# Patient Record
Sex: MALE | Race: WHITE | NOT HISPANIC OR LATINO | Employment: FULL TIME | URBAN - NONMETROPOLITAN AREA
[De-identification: names, ages, dates, MRNs, and addresses within clinical notes are randomized per-mention and may not be internally consistent; named-entity substitution may affect disease eponyms.]

---

## 2020-01-27 ENCOUNTER — LAB (OUTPATIENT)
Dept: LAB | Facility: HOSPITAL | Age: 45
End: 2020-01-27

## 2020-01-27 ENCOUNTER — OFFICE VISIT (OUTPATIENT)
Dept: GASTROENTEROLOGY | Facility: CLINIC | Age: 45
End: 2020-01-27

## 2020-01-27 VITALS
BODY MASS INDEX: 27.74 KG/M2 | HEIGHT: 72 IN | OXYGEN SATURATION: 96 % | SYSTOLIC BLOOD PRESSURE: 124 MMHG | HEART RATE: 70 BPM | TEMPERATURE: 98.2 F | DIASTOLIC BLOOD PRESSURE: 80 MMHG | RESPIRATION RATE: 18 BRPM | WEIGHT: 204.8 LBS

## 2020-01-27 DIAGNOSIS — E66.3 OVERWEIGHT (BMI 25.0-29.9): ICD-10-CM

## 2020-01-27 DIAGNOSIS — R19.5 LOOSE STOOLS: ICD-10-CM

## 2020-01-27 DIAGNOSIS — K64.8 INTERNAL HEMORRHOIDS: ICD-10-CM

## 2020-01-27 DIAGNOSIS — K92.1 HEMATOCHEZIA: Primary | ICD-10-CM

## 2020-01-27 DIAGNOSIS — K92.1 HEMATOCHEZIA: ICD-10-CM

## 2020-01-27 LAB
ALBUMIN SERPL-MCNC: 4.4 G/DL (ref 3.5–5.2)
ALBUMIN/GLOB SERPL: 1.4 G/DL
ALP SERPL-CCNC: 63 U/L (ref 39–117)
ALT SERPL W P-5'-P-CCNC: 14 U/L (ref 1–41)
ANION GAP SERPL CALCULATED.3IONS-SCNC: 12.3 MMOL/L (ref 5–15)
AST SERPL-CCNC: 19 U/L (ref 1–40)
BASOPHILS # BLD AUTO: 0.04 10*3/MM3 (ref 0–0.2)
BASOPHILS NFR BLD AUTO: 0.5 % (ref 0–1.5)
BILIRUB SERPL-MCNC: 0.5 MG/DL (ref 0.2–1.2)
BUN BLD-MCNC: 16 MG/DL (ref 6–20)
BUN/CREAT SERPL: 15.4 (ref 7–25)
CALCIUM SPEC-SCNC: 9.6 MG/DL (ref 8.6–10.5)
CHLORIDE SERPL-SCNC: 101 MMOL/L (ref 98–107)
CO2 SERPL-SCNC: 26.7 MMOL/L (ref 22–29)
CREAT BLD-MCNC: 1.04 MG/DL (ref 0.76–1.27)
DEPRECATED RDW RBC AUTO: 39.4 FL (ref 37–54)
EOSINOPHIL # BLD AUTO: 0.14 10*3/MM3 (ref 0–0.4)
EOSINOPHIL NFR BLD AUTO: 1.7 % (ref 0.3–6.2)
ERYTHROCYTE [DISTWIDTH] IN BLOOD BY AUTOMATED COUNT: 12.8 % (ref 12.3–15.4)
GFR SERPL CREATININE-BSD FRML MDRD: 78 ML/MIN/1.73
GLOBULIN UR ELPH-MCNC: 3.1 GM/DL
GLUCOSE BLD-MCNC: 90 MG/DL (ref 65–99)
HCT VFR BLD AUTO: 46.7 % (ref 37.5–51)
HGB BLD-MCNC: 16.5 G/DL (ref 13–17.7)
IMM GRANULOCYTES # BLD AUTO: 0.03 10*3/MM3 (ref 0–0.05)
IMM GRANULOCYTES NFR BLD AUTO: 0.4 % (ref 0–0.5)
LYMPHOCYTES # BLD AUTO: 1.25 10*3/MM3 (ref 0.7–3.1)
LYMPHOCYTES NFR BLD AUTO: 14.9 % (ref 19.6–45.3)
MCH RBC QN AUTO: 30.4 PG (ref 26.6–33)
MCHC RBC AUTO-ENTMCNC: 35.3 G/DL (ref 31.5–35.7)
MCV RBC AUTO: 86.2 FL (ref 79–97)
MONOCYTES # BLD AUTO: 0.73 10*3/MM3 (ref 0.1–0.9)
MONOCYTES NFR BLD AUTO: 8.7 % (ref 5–12)
NEUTROPHILS # BLD AUTO: 6.19 10*3/MM3 (ref 1.7–7)
NEUTROPHILS NFR BLD AUTO: 73.8 % (ref 42.7–76)
NRBC BLD AUTO-RTO: 0 /100 WBC (ref 0–0.2)
PLATELET # BLD AUTO: 220 10*3/MM3 (ref 140–450)
PMV BLD AUTO: 10.7 FL (ref 6–12)
POTASSIUM BLD-SCNC: 4.3 MMOL/L (ref 3.5–5.2)
PROT SERPL-MCNC: 7.5 G/DL (ref 6–8.5)
RBC # BLD AUTO: 5.42 10*6/MM3 (ref 4.14–5.8)
SODIUM BLD-SCNC: 140 MMOL/L (ref 136–145)
WBC NRBC COR # BLD: 8.38 10*3/MM3 (ref 3.4–10.8)

## 2020-01-27 PROCEDURE — 85025 COMPLETE CBC W/AUTO DIFF WBC: CPT

## 2020-01-27 PROCEDURE — 36415 COLL VENOUS BLD VENIPUNCTURE: CPT

## 2020-01-27 PROCEDURE — 99204 OFFICE O/P NEW MOD 45 MIN: CPT | Performed by: INTERNAL MEDICINE

## 2020-01-27 PROCEDURE — 80053 COMPREHEN METABOLIC PANEL: CPT

## 2020-01-27 RX ORDER — SODIUM, POTASSIUM,MAG SULFATES 17.5-3.13G
2 SOLUTION, RECONSTITUTED, ORAL ORAL ONCE
Qty: 2 BOTTLE | Refills: 0 | Status: SHIPPED | OUTPATIENT
Start: 2020-01-27 | End: 2020-01-27

## 2020-01-27 RX ORDER — SODIUM CHLORIDE 9 MG/ML
70 INJECTION, SOLUTION INTRAVENOUS CONTINUOUS PRN
Status: CANCELLED | OUTPATIENT
Start: 2020-01-27

## 2020-01-27 RX ORDER — METOCLOPRAMIDE 10 MG/1
10 TABLET ORAL ONCE
Qty: 1 TABLET | Refills: 0 | Status: SHIPPED | OUTPATIENT
Start: 2020-01-27 | End: 2020-01-27

## 2020-01-27 NOTE — PROGRESS NOTES
New Patient Consult      Date: 2020   Patient Name: Yonathan Ley  MRN: [id number]  : 1975     Referring Physician: Vika Higgins, *    Chief Complaint   Patient presents with   • Rectal Bleeding       History of Present Illness: Yonathan Ley is a 44 y.o. male who is here today to establish care with Gastroenterology for evaluation of rectal bleeding. History of rectal bleeding with red blood in wipes  and toilet bowel. Had these episodes few times in the past couple of years, but recently he noticed large amount which is not normal to him. He has intermittent itching, No anorectal pain. This patient deny any abdominal pain, change in bowel habit, melena.  Usually his BM is mostly loose 3-5 times a day. Weight is stable. Pt denies nausea vomiting or odynophagia or dysphagia. There is no history of acid reflux. There is no history of anemia. No prior history of EGD. Last colonoscopy around  and was told he has hemorriods. No family history of colon cancer or any GI malignancy.     Subjective      Past Medical History:   Past Medical History:   Diagnosis Date   • Blood in stool        Past Surgical History:   Past Surgical History:   Procedure Laterality Date   • HERNIA REPAIR         Family History:   Family History   Problem Relation Age of Onset   • Ovarian cancer Mother    • Diabetes Mother    • Lung cancer Father    • Diabetes Father        Social History:   Social History     Socioeconomic History   • Marital status:      Spouse name: Not on file   • Number of children: Not on file   • Years of education: Not on file   • Highest education level: Not on file   Tobacco Use   • Smoking status: Never Smoker   • Smokeless tobacco: Current User   Substance and Sexual Activity   • Alcohol use: Never     Frequency: Never   • Drug use: Never   • Sexual activity: Defer         Current Outpatient Medications:   •  metoclopramide (REGLAN) 10 MG tablet, Take 1 tablet by mouth 1  "(One) Time for 1 dose. Please see prep instructions from office., Disp: 1 tablet, Rfl: 0  •  sodium-potassium-magnesium sulfates (SUPREP BOWEL PREP KIT) 17.5-3.13-1.6 GM/177ML solution oral solution, Take 2 bottles by mouth 1 (One) Time for 1 dose. Please see prep instructions from office., Disp: 2 bottle, Rfl: 0    No Known Allergies    Review of Systems:   Review of Systems   Constitutional: Negative for chills, fever, unexpected weight gain and unexpected weight loss.   HENT: Negative for congestion, ear pain, postnasal drip, sinus pressure and sore throat.    Eyes: Negative for blurred vision and visual disturbance.   Respiratory: Negative for cough, chest tightness and shortness of breath.    Cardiovascular: Negative for chest pain, palpitations and leg swelling.   Gastrointestinal: Positive for anal bleeding, GERD and indigestion. Negative for abdominal pain, blood in stool, constipation, diarrhea, nausea and vomiting.   Endocrine: Negative for polyphagia.   Genitourinary: Negative for dysuria and hematuria.   Musculoskeletal: Negative for back pain, joint swelling and neck pain.   Skin: Negative for rash, skin lesions and bruise.   Neurological: Negative for dizziness, seizures, speech difficulty, weakness, numbness and confusion.   Hematological: Negative for adenopathy. Does not bruise/bleed easily.   Psychiatric/Behavioral: Negative for hallucinations, suicidal ideas and depressed mood.       The following portions of the patient's history were reviewed and updated as appropriate: allergies, current medications, past family history, past medical history, past social history, past surgical history and problem list.    Objective     Physical Exam:  Vital Signs:   Vitals:    01/27/20 1450   BP: 124/80   Pulse: 70   Resp: 18   Temp: 98.2 °F (36.8 °C)   TempSrc: Temporal   SpO2: 96%   Weight: 92.9 kg (204 lb 12.8 oz)   Height: 182.9 cm (72\")       Physical Exam   Constitutional: He is oriented to person, place, " and time. He appears well-developed and well-nourished.   HENT:   Head: Normocephalic and atraumatic.   Right Ear: External ear normal.   Left Ear: External ear normal.   Mouth/Throat: Oropharynx is clear and moist.   Eyes: Pupils are equal, round, and reactive to light. Conjunctivae and EOM are normal.   Neck: Normal range of motion. No tracheal deviation present. No thyromegaly present.   Cardiovascular: Normal rate and regular rhythm.   No murmur heard.  Pulmonary/Chest: Effort normal and breath sounds normal. No respiratory distress.   Abdominal: Soft. Bowel sounds are normal. He exhibits no mass. No hernia.   Musculoskeletal: Normal range of motion. He exhibits no edema.   Neurological: He is alert and oriented to person, place, and time. No cranial nerve deficit or sensory deficit.   Skin: Skin is warm and dry.   Psychiatric: He has a normal mood and affect. His behavior is normal. Judgment and thought content normal.   Nursing note and vitals reviewed.      Results Review:   I have reviewed the patient's new clinical and imaging results and agree with the interpretation.     Assessment / Plan      Assessment & Plan:  1. Hematochezia  Patient states that he always had some red blood in wipes intermittently but this time he had a alarming amount of blood couple of weeks ago.  Deny any melena.  No abdominal pain.  Does have a loose stool 3-4 times daily which is chronic not bothering him as per patient.  Nausea vomiting.  Colonoscopy about 10 years ago told normal except hemorrhoids.  This could be hemorrhoidal bleeding however to rule out any other lower GI bleeding pathology including neoplastic process to rule out.  We will schedule him for colonoscopy now  - Case Request; Standing  - sodium chloride 0.9 % infusion  - Case Request  - metoclopramide (REGLAN) 10 MG tablet; Take 1 tablet by mouth 1 (One) Time for 1 dose. Please see prep instructions from office.  Dispense: 1 tablet; Refill: 0  -  sodium-potassium-magnesium sulfates (SUPREP BOWEL PREP KIT) 17.5-3.13-1.6 GM/177ML solution oral solution; Take 2 bottles by mouth 1 (One) Time for 1 dose. Please see prep instructions from office.  Dispense: 2 bottle; Refill: 0  - CBC Auto Differential; Future  - Comprehensive Metabolic Panel; Future    2. Internal hemorrhoids  Patient appears to have intermittent flareup of his internal hemorrhoids.  Now has occasional itching no pain.  Advised sitz bath twice daily  Preparation H topical as needed    3. Loose stools  Usually 3-4 bowel movements loose daily, gets worse with the daily food.  No family history of celiac disease.  No any lab studies done for the past 4 years  Will get the basic labs and also get a random colon biopsy to rule out microscopic colitis  - CBC Auto Differential; Future  - Comprehensive Metabolic Panel; Future    4. Overweight (BMI 25.0-29.9)  Dietary changes and lifestyle changes have been discussed      Follow Up:   Return in about 8 weeks (around 3/23/2020).    Yoni Judge MD  [facility] [place]  1/27/2020  3:24 PM    Please note that portions of this note may have been completed with a voice recognition program. Efforts were made to edit the dictations, but occasionally words are mistranscribed.

## 2020-01-29 PROBLEM — K92.1 HEMATOCHEZIA: Status: ACTIVE | Noted: 2020-01-29

## 2020-01-29 PROBLEM — K64.8 INTERNAL HEMORRHOIDS: Status: ACTIVE | Noted: 2020-01-29

## 2020-02-21 ENCOUNTER — ANESTHESIA EVENT (OUTPATIENT)
Dept: GASTROENTEROLOGY | Facility: HOSPITAL | Age: 45
End: 2020-02-21

## 2020-02-21 ENCOUNTER — HOSPITAL ENCOUNTER (OUTPATIENT)
Facility: HOSPITAL | Age: 45
Setting detail: HOSPITAL OUTPATIENT SURGERY
Discharge: HOME | End: 2020-02-21
Attending: INTERNAL MEDICINE | Admitting: INTERNAL MEDICINE

## 2020-02-21 ENCOUNTER — ANESTHESIA (OUTPATIENT)
Dept: GASTROENTEROLOGY | Facility: HOSPITAL | Age: 45
End: 2020-02-21

## 2020-02-21 VITALS
TEMPERATURE: 97.8 F | HEIGHT: 72 IN | BODY MASS INDEX: 27.02 KG/M2 | RESPIRATION RATE: 18 BRPM | OXYGEN SATURATION: 99 % | DIASTOLIC BLOOD PRESSURE: 86 MMHG | SYSTOLIC BLOOD PRESSURE: 120 MMHG | HEART RATE: 66 BPM | WEIGHT: 199.5 LBS

## 2020-02-21 DIAGNOSIS — K64.8 INTERNAL HEMORRHOIDS: ICD-10-CM

## 2020-02-21 DIAGNOSIS — K92.1 HEMATOCHEZIA: ICD-10-CM

## 2020-02-21 PROCEDURE — 25010000002 PROPOFOL 200 MG/20ML EMULSION: Performed by: NURSE ANESTHETIST, CERTIFIED REGISTERED

## 2020-02-21 RX ORDER — LIDOCAINE HYDROCHLORIDE 20 MG/ML
INJECTION, SOLUTION INTRAVENOUS AS NEEDED
Status: DISCONTINUED | OUTPATIENT
Start: 2020-02-21 | End: 2020-02-21 | Stop reason: SURG

## 2020-02-21 RX ORDER — SODIUM CHLORIDE 9 MG/ML
70 INJECTION, SOLUTION INTRAVENOUS CONTINUOUS PRN
Status: DISCONTINUED | OUTPATIENT
Start: 2020-02-21 | End: 2020-02-21 | Stop reason: HOSPADM

## 2020-02-21 RX ORDER — PROPOFOL 10 MG/ML
INJECTION, EMULSION INTRAVENOUS AS NEEDED
Status: DISCONTINUED | OUTPATIENT
Start: 2020-02-21 | End: 2020-02-21 | Stop reason: SURG

## 2020-02-21 RX ADMIN — PROPOFOL 100 MG: 10 INJECTION, EMULSION INTRAVENOUS at 08:46

## 2020-02-21 RX ADMIN — SODIUM CHLORIDE 70 ML/HR: 9 INJECTION, SOLUTION INTRAVENOUS at 06:50

## 2020-02-21 RX ADMIN — LIDOCAINE HYDROCHLORIDE 60 MG: 20 INJECTION, SOLUTION INTRAVENOUS at 08:37

## 2020-02-21 RX ADMIN — PROPOFOL 100 MG: 10 INJECTION, EMULSION INTRAVENOUS at 08:42

## 2020-02-21 RX ADMIN — PROPOFOL 50 MG: 10 INJECTION, EMULSION INTRAVENOUS at 08:50

## 2020-02-21 RX ADMIN — PROPOFOL 50 MG: 10 INJECTION, EMULSION INTRAVENOUS at 08:37

## 2020-02-21 NOTE — ANESTHESIA POSTPROCEDURE EVALUATION
Patient: Yonathan Ley    Procedure Summary     Date:  02/21/20 Room / Location:  [facility] ENDOSCOPY 2 / [facility] ENDOSCOPY    Anesthesia Start:  0837 Anesthesia Stop:  0857    Procedure:  COLONOSCOPY BIOPSY AN DPOLYPECTOMY (N/A Anus) Diagnosis:       Hematochezia      Internal hemorrhoids      (Hematochezia [K92.1])      (Internal hemorrhoids [K64.8])    Surgeon:  Yoni Judge MD Provider:  Rodrigo Flores CRNA    Anesthesia Type:  MAC ASA Status:  1          Anesthesia Type: MAC    Vitals  No vitals data found for the desired time range.          Post Anesthesia Care and Evaluation    Patient location during evaluation: bedside  Patient participation: complete - patient participated  Level of consciousness: awake and alert  Pain score: 0  Pain management: adequate  Airway patency: patent  Anesthetic complications: No anesthetic complications  PONV Status: none  Cardiovascular status: acceptable  Respiratory status: acceptable  Hydration status: acceptable

## 2020-02-21 NOTE — DISCHARGE INSTRUCTIONS
To assist you in voiding:  Drink plenty of fluids  Listen to running water while attempting to void.    If you are unable to urinate and you have an uncomfortable urge to void or it has been   6 hours since you were discharged, return to the Emergency Room.      Rest today  No pushing,pulling,tugging,heavy lifting, or strenuous activity   No major decision making,driving,or drinking alcoholic beverages for 24 hours due to the sedation you received  Always use good hand hygiene/washing technique  No driving on pain medication.

## 2020-02-21 NOTE — ANESTHESIA PREPROCEDURE EVALUATION
Anesthesia Evaluation     Patient summary reviewed and Nursing notes reviewed   NPO Solid Status: > 8 hours  NPO Liquid Status: > 8 hours           Airway   Mallampati: II  TM distance: >3 FB  Neck ROM: full  No difficulty expected  Dental      Pulmonary    Cardiovascular         Neuro/Psych  GI/Hepatic/Renal/Endo      Musculoskeletal     Abdominal    Substance History      OB/GYN          Other                        Anesthesia Plan    ASA 1     MAC     intravenous induction     Anesthetic plan, all risks, benefits, and alternatives have been provided, discussed and informed consent has been obtained with: patient.    Plan discussed with CRNA.

## 2020-02-28 LAB
LAB AP CASE REPORT: NORMAL
PATH REPORT.FINAL DX SPEC: NORMAL

## 2020-03-23 ENCOUNTER — OFFICE VISIT (OUTPATIENT)
Dept: GASTROENTEROLOGY | Facility: CLINIC | Age: 45
End: 2020-03-23

## 2020-03-23 VITALS
DIASTOLIC BLOOD PRESSURE: 80 MMHG | BODY MASS INDEX: 27.5 KG/M2 | TEMPERATURE: 97.8 F | SYSTOLIC BLOOD PRESSURE: 122 MMHG | WEIGHT: 203 LBS | HEIGHT: 72 IN | HEART RATE: 69 BPM | RESPIRATION RATE: 18 BRPM | OXYGEN SATURATION: 98 %

## 2020-03-23 DIAGNOSIS — K92.1 HEMATOCHEZIA: ICD-10-CM

## 2020-03-23 DIAGNOSIS — K63.5 HYPERPLASTIC COLONIC POLYP, UNSPECIFIED PART OF COLON: ICD-10-CM

## 2020-03-23 DIAGNOSIS — K64.8 INTERNAL HEMORRHOIDS: Primary | ICD-10-CM

## 2020-03-23 PROCEDURE — 99213 OFFICE O/P EST LOW 20 MIN: CPT | Performed by: INTERNAL MEDICINE

## 2020-03-23 NOTE — PROGRESS NOTES
Follow Up Note     Date: 2020   Patient Name: Yonathan Ley  MRN: [id number]  : 1975     Referring Physician: No ref. provider found    Chief Complaint:    Chief Complaint   Patient presents with   • Follow-up   • Rectal Bleeding       Interval History:   3/23/2020  Yonathan Ley is a 44 y.o. male who is here today for follow up after colonoscopy.  He is occasionally still saying tinge of blood in the wipe.  He has not used Anusol cream so far.   Denies any new symptoms    2020  Yonathan eLy is a 44 y.o. male who is here today to establish care with Gastroenterology for evaluation of rectal bleeding. History of rectal bleeding with red blood in wipes  and toilet bowel. Had these episodes few times in the past couple of years, but recently he noticed large amount which is not normal to him. He has intermittent itching, No anorectal pain. This patient deny any abdominal pain, change in bowel habit, melena.  Usually his BM is mostly loose 3-5 times a day. Weight is stable. Pt denies nausea vomiting or odynophagia or dysphagia. There is no history of acid reflux. There is no history of anemia. No prior history of EGD. Last colonoscopy around  and was told he has hemorriods. No family history of colon cancer or any GI malignancy    Subjective      Past Medical History: @[facility]  Past Surgical History:   Past Surgical History:   Procedure Laterality Date   • COLONOSCOPY     • COLONOSCOPY N/A 2020    Procedure: COLONOSCOPY BIOPSY AN DPOLYPECTOMY;  Surgeon: Yoni Judge MD;  Location: [facility] ENDOSCOPY;  Service: Gastroenterology;  Laterality: N/A;   • HERNIA REPAIR         Family History:   Family History   Problem Relation Age of Onset   • Ovarian cancer Mother    • Diabetes Mother    • Lung cancer Father    • Diabetes Father        Social History:   Social History     Socioeconomic History   • Marital status:      Spouse name: Not on file   • Number of children:  "Not on file   • Years of education: Not on file   • Highest education level: Not on file   Tobacco Use   • Smoking status: Never Smoker   • Smokeless tobacco: Current User   Substance and Sexual Activity   • Alcohol use: Never     Frequency: Never   • Drug use: Never   • Sexual activity: Defer       Medications:   No current outpatient medications on file.    Allergies:   No Known Allergies    Review of Systems:   Review of Systems   Constitutional: Negative for appetite change, fatigue and unexpected weight loss.   Gastrointestinal: Positive for anal bleeding. Negative for abdominal distention, abdominal pain, blood in stool, constipation, diarrhea, nausea, rectal pain, vomiting, GERD and indigestion.       The following portions of the patient's history were reviewed and updated as appropriate: allergies, current medications, past family history, past medical history, past social history, past surgical history and problem list.    Objective     Physical Exam:  Vital Signs:   Vitals:    03/23/20 1456   BP: 122/80   Pulse: 69   Resp: 18   Temp: 97.8 °F (36.6 °C)   TempSrc: Temporal   SpO2: 98%   Weight: 92.1 kg (203 lb)   Height: 182.9 cm (72\")       Physical Exam   Constitutional: He is oriented to person, place, and time. Vital signs are normal. He appears well-developed and well-nourished.   HENT:   Head: Normocephalic and atraumatic.   Right Ear: External ear normal.   Left Ear: External ear normal.   Nose: Nose normal.   Mouth/Throat: Oropharynx is clear and moist.   Eyes: Conjunctivae are normal.   Neck: Normal range of motion. Neck supple.   Cardiovascular: Normal rate, regular rhythm and normal heart sounds.   Pulmonary/Chest: Effort normal and breath sounds normal.   Abdominal: Soft. Bowel sounds are normal. He exhibits no distension, no ascites and no mass. There is no tenderness.   Musculoskeletal: Normal range of motion.   Neurological: He is alert and oriented to person, place, and time.   Skin: Skin is " warm and dry.   Psychiatric: He has a normal mood and affect. His behavior is normal. Judgment normal.   Nursing note and vitals reviewed.      Results Review:   I reviewed the patient's new clinical results.    Lab Results (most recent)     None        Imaging Results (Most Recent)     None          Assessment / Plan      1. Hematochezia  From a intermittent bleeding from hemorrhoids. His recent lab works were unremarkable with a normal hemoglobin.  No signs of any significant bleeding.  He had a colonoscopy done few weeks ago which was normal except few hyperplastic rectal polyps and internal hemorrhoids which were not bleeding.  Advised Anusol cream twice daily for 1 to 2 weeks  Sitz bath twice daily  Follow-up as needed  He needs a screening colonoscopy in 10 years that will be in 2030    2. Internal hemorrhoids  Recent flareup with the bleeding and perianal itching  Recommended sitz bath and the Anusol cream for now as above.  Colonoscopy done recently revealed only grade 1 internal hemorrhoids    3. Loose stools  Mostly soft stool 2-3 times daily is chronic.  Not bothering him much.  No abdominal pain  He has a random colon biopsies were negative for any microscopic colitis.  Monitor for now,     4. Overweight (BMI 25.0-29.9)  Dietary changes and lifestyle changes have been discussed  His liver enzymes are all normal and signs of Gonsalez.       Follow Up:   No follow-ups on file.    Yoni Judge MD  [facility] [place]  3/23/2020  15:00     Please note that portions of this note may have been completed with a voice recognition program. Efforts were made to edit the dictations, but occasionally words are mistranscribed.

## 2021-05-06 ENCOUNTER — HOSPITAL ENCOUNTER (OUTPATIENT)
Dept: GENERAL RADIOLOGY | Facility: HOSPITAL | Age: 46
Discharge: HOME | End: 2021-05-06
Payer: COMMERCIAL

## 2021-05-06 ENCOUNTER — HOSPITAL ENCOUNTER (OUTPATIENT)
Facility: HOSPITAL | Age: 46
Discharge: HOME | End: 2021-05-06
Payer: COMMERCIAL

## 2021-05-06 DIAGNOSIS — M25.571 ACUTE RIGHT ANKLE PAIN: ICD-10-CM

## 2021-05-06 DIAGNOSIS — M25.561 CHRONIC PAIN OF RIGHT KNEE: ICD-10-CM

## 2021-05-06 DIAGNOSIS — G89.29 CHRONIC PAIN OF RIGHT KNEE: ICD-10-CM

## 2021-05-06 PROCEDURE — 73562 X-RAY EXAM OF KNEE 3: CPT

## 2021-05-06 PROCEDURE — 73610 X-RAY EXAM OF ANKLE: CPT

## 2021-05-07 ENCOUNTER — HOSPITAL ENCOUNTER (OUTPATIENT)
Dept: ULTRASOUND IMAGING | Facility: HOSPITAL | Age: 46
Discharge: HOME | End: 2021-05-07
Payer: COMMERCIAL

## 2021-05-07 DIAGNOSIS — M79.604 LEG PAIN, RIGHT: ICD-10-CM

## 2021-05-07 PROCEDURE — 93971 EXTREMITY STUDY: CPT

## 2022-11-29 DIAGNOSIS — Z31.9 INFERTILITY MANAGEMENT: Primary | ICD-10-CM

## 2022-12-13 ENCOUNTER — LAB (OUTPATIENT)
Dept: LAB | Facility: HOSPITAL | Age: 47
End: 2022-12-13

## 2022-12-13 DIAGNOSIS — Z31.9 INFERTILITY MANAGEMENT: ICD-10-CM

## 2022-12-13 PROCEDURE — 89320 SEMEN ANAL VOL/COUNT/MOT: CPT

## 2022-12-14 ENCOUNTER — TELEPHONE (OUTPATIENT)
Dept: OBSTETRICS AND GYNECOLOGY | Facility: CLINIC | Age: 47
End: 2022-12-14

## 2022-12-14 DIAGNOSIS — R86.9 ABNORMAL SEMEN ANALYSIS: Primary | ICD-10-CM

## 2023-01-01 LAB
CHARACTER SMN: ABNORMAL
COLOR SMN: ABNORMAL
LIQUEFACTION TIME SMN: ABNORMAL MIN
PH SMN: 9 [PH]
SPECIMEN VOL SMN: 1.5 ML (ref 2–5)
SPERM # SMN: 65.5 MILLIONS/ML
SPERM MORPHOLOGY COMMENT: ABNORMAL
SPERM MOTILE NFR SMN: 70 % MOTILE (ref 50–100)
VISC SMN: ABNORMAL CP

## 2023-01-05 ENCOUNTER — OFFICE VISIT (OUTPATIENT)
Dept: UROLOGY | Facility: CLINIC | Age: 48
End: 2023-01-05
Payer: COMMERCIAL

## 2023-01-05 ENCOUNTER — PATIENT ROUNDING (BHMG ONLY) (OUTPATIENT)
Dept: UROLOGY | Facility: CLINIC | Age: 48
End: 2023-01-05
Payer: COMMERCIAL

## 2023-01-05 VITALS
HEART RATE: 71 BPM | HEIGHT: 72 IN | BODY MASS INDEX: 29.8 KG/M2 | DIASTOLIC BLOOD PRESSURE: 102 MMHG | SYSTOLIC BLOOD PRESSURE: 152 MMHG | TEMPERATURE: 97.7 F | WEIGHT: 220 LBS | OXYGEN SATURATION: 96 %

## 2023-01-05 DIAGNOSIS — N46.9 INFERTILITY MALE: Primary | ICD-10-CM

## 2023-01-05 PROCEDURE — 99203 OFFICE O/P NEW LOW 30 MIN: CPT | Performed by: UROLOGY

## 2023-01-05 NOTE — PROGRESS NOTES
Chief Complaint  Infertility    HPI  . Yonathan Ley is a 47 y.o. gentleman who presents to the clinic today due to infertility.  He and his wife have been attempting conception for 36 months.  He has never impregnated a partner in the past.    Sexual method  Neither use birth control and they do not use commercial lubricants.  Wynne frequency: approximately 7 times per week.    Partner  His partner is 44 years old and she has previously been pregnant.   She has no chronic medical conditions.  She says she no longer has regular menses.  She has undergone a lab fertility work up which was reportedly normal.  She has never had an HSG    Past Medical History  Past Medical History:   Diagnosis Date   • Blood in stool        Past Surgical History  Past Surgical History:   Procedure Laterality Date   • COLONOSCOPY     • COLONOSCOPY N/A 2/21/2020    Procedure: COLONOSCOPY BIOPSY AN DPOLYPECTOMY;  Surgeon: Yoni Judge MD;  Location: [facility] ENDOSCOPY;  Service: Gastroenterology;  Laterality: N/A;   • HERNIA REPAIR         Medications  No current outpatient medications on file.    Allergies  No Known Allergies    Social History  Social History     Socioeconomic History   • Marital status:    Tobacco Use   • Smoking status: Never   • Smokeless tobacco: Current   Vaping Use   • Vaping Use: Never used   Substance and Sexual Activity   • Alcohol use: Never   • Drug use: Never   • Sexual activity: Yes     Partners: Female       Family History  He has no family history fertility-related issues or cystic fibrosis.    Review of Systems  Constitutional: No fevers or chills  Skin: Negative for rash  Endocrine: No heat/cold intolerance   Cardiovascular: Negative for chest pain or dyspnea on exertion  Respiratory: Negative for shortness of breath or wheezing  Gastrointestinal: No nausea or vomiting  Genitourinary: Negative for current gross hematuria.  Musculoskeletal: No flank pain  Neurological:  Negative  for frequent headaches or dizziness  Lymph/Heme: Negative for leg swelling or calf pain.    Physical Exam  Visit Vitals  BP (!) 152/102 (BP Location: Left arm, Patient Position: Sitting, Cuff Size: Adult)   Pulse 71   Temp 97.7 °F (36.5 °C) (Temporal)   Ht 182.9 cm (72\")   Wt 99.8 kg (220 lb)   SpO2 96%   BMI 29.84 kg/m²     Constitutional: NAD, WDWN.   HEENT: NCAT. Conjunctivae normal.  MMM.    Cardiovascular: Regular rate.  Pulmonary/Chest: Respirations are even and non-labored bilaterally.  Abdominal: Soft. No distension, tenderness, masses or guarding. No CVA tenderness.  Neurological: A + O x 3.  Cranial Nerves II-XII grossly intact. Normal gait.  Extremities: SHERLYN x 4, Warm. No clubbing.  No cyanosis.    Skin: Pink, warm and dry.  No rashes noted.  Psychiatric:  Normal mood and affect    Genitourinary  Pt refused    Labs  Lab Results   Component Value Date    GLUCOSE 90 01/27/2020    CALCIUM 9.6 01/27/2020     01/27/2020    K 4.3 01/27/2020    CO2 26.7 01/27/2020     01/27/2020    BUN 16 01/27/2020    CREATININE 1.04 01/27/2020    EGFRIFNONA 78 01/27/2020    BCR 15.4 01/27/2020    ANIONGAP 12.3 01/27/2020       Semen Analysis   Latest Reference Range & Units 12/13/22 08:31   Appearance  Cloudy   Semen Color White, Gray  White   Volume, Semen 2.0 - 5.0 mL 1.5 (L)   pH Seminal Fluid >7.0  9.0   Liquefaction Time Normal  Abnormal !   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  His semen analysis is essentially normal.  He only had 1 day of abstinence prior to producing it, instead of the recommended 3-5.   Risk factors:  wifes advanced age    Plan  1.   FU PRN  2.   I recommended they go see reproductive endocrinology at [facility], and placed a referral.  I briefly discussed different assisted reproductive techniques with them, including IUI, IVF, and ICSI.      No follow-ups on file.    Mitul Bautista MD

## 2023-01-06 NOTE — PROGRESS NOTES
January 6, 2023    Hello, may I speak with Yonathan Ley?unable to reach patient.    My name is Kelly    I am  with [facility] [place]  [address]  [address]  [facility] [address] [phone].    Before we get started may I verify your date of birth? 1975    I am calling to officially welcome you to our practice and ask about your recent visit. Is this a good time to talk?     Tell me about your visit with us. What things went well?        We're always looking for ways to make our patients' experiences even better. Do you have recommendations on ways we may improve?     Overall were you satisfied with your first visit to our practice?        I appreciate you taking the time to speak with me today. Is there anything else I can do for you?       Thank you, and have a great day.

## 2023-02-08 DIAGNOSIS — R86.9 ABNORMAL SEMEN ANALYSIS: Primary | ICD-10-CM

## 2023-02-17 PROCEDURE — 89320 SEMEN ANAL VOL/COUNT/MOT: CPT | Performed by: UROLOGY

## 2023-02-20 LAB
CHARACTER SMN: ABNORMAL
COLOR SMN: ABNORMAL
LIQUEFACTION TIME SMN: ABNORMAL MIN
PH SMN: 9 [PH]
SPECIMEN VOL SMN: 1 ML (ref 2–5)
SPERM # SMN: 61.7 MILLIONS/ML
SPERM MORPHOLOGY COMMENT: ABNORMAL
SPERM MOTILE NFR SMN: 70 % MOTILE (ref 50–100)
VISC SMN: ABNORMAL CP

## 2023-04-14 NOTE — LETTER
January 5, 2023     Sandy Sam MD  [address]  [address]  [facility] [address]    Patient: Yonathan Ley   YOB: 1975   Date of Visit: 1/5/2023     Dear Dr. Flaco MD:    Thank you for referring Yonathan Ley to me for evaluation. Below are the relevant portions of my assessment and plan of care.    If you have questions, please do not hesitate to call me. I look forward to following Yonathan along with you.         Sincerely,        Mitul Bautista MD        CC: No Recipients    Progress Notes:  Chief Complaint  Infertility    HPI  Mr. Yonathan Ley is a 47 y.o. gentleman who presents to the clinic today due to infertility.  He and his wife have been attempting conception for 36 months.  He has never impregnated a partner in the past.    Sexual method  Neither use birth control and they do not use commercial lubricants.  [place] frequency: approximately 7 times per week.    Partner  His partner is 44 years old and she has previously been pregnant.   She has no chronic medical conditions.  She says she no longer has regular menses.  She has undergone a lab fertility work up which was reportedly normal.  She has never had an HSG    Past Medical History  Past Medical History:   Diagnosis Date   • Blood in stool        Past Surgical History  Past Surgical History:   Procedure Laterality Date   • COLONOSCOPY     • COLONOSCOPY N/A 2/21/2020    Procedure: COLONOSCOPY BIOPSY AN DPOLYPECTOMY;  Surgeon: Yoni Judge MD;  Location: [facility] ENDOSCOPY;  Service: Gastroenterology;  Laterality: N/A;   • HERNIA REPAIR         Medications  No current outpatient medications on file.    Allergies  No Known Allergies    Social History  Social History     Socioeconomic History   • Marital status:    Tobacco Use   • Smoking status: Never   • Smokeless tobacco: Current   Vaping Use   • Vaping Use: Never used   Substance and Sexual Activity   • Alcohol use: Never   • Drug use: Never   •  Sexual activity: Yes     Partners: Female       Family History  He has no family history fertility-related issues or cystic fibrosis.    Review of Systems  Constitutional: No fevers or chills  Skin: Negative for rash  Endocrine: No heat/cold intolerance   Cardiovascular: Negative for chest pain or dyspnea on exertion  Respiratory: Negative for shortness of breath or wheezing  Gastrointestinal: No nausea or vomiting  Genitourinary: Negative for current gross hematuria.  Musculoskeletal: No flank pain  Neurological:  Negative for frequent headaches or dizziness  Lymph/Heme: Negative for leg swelling or calf pain.    Physical Exam  Visit Vitals  BP (!) 152/102 (BP Location: Left arm, Patient Position: Sitting, Cuff Size: Adult)   Pulse 71   Temp 97.7 °F (36.5 °C) (Temporal)   Ht 182.9 cm (72\")   Wt 99.8 kg (220 lb)   SpO2 96%   BMI 29.84 kg/m²     Constitutional: NAD, WDWN.   HEENT: NCAT. Conjunctivae normal.  MMM.    Cardiovascular: Regular rate.  Pulmonary/Chest: Respirations are even and non-labored bilaterally.  Abdominal: Soft. No distension, tenderness, masses or guarding. No CVA tenderness.  Neurological: A + O x 3.  Cranial Nerves II-XII grossly intact. Normal gait.  Extremities: SHERLYN x 4, Warm. No clubbing.  No cyanosis.    Skin: Pink, warm and dry.  No rashes noted.  Psychiatric:  Normal mood and affect    Genitourinary  Pt refused    Labs  Lab Results   Component Value Date    GLUCOSE 90 01/27/2020    CALCIUM 9.6 01/27/2020     01/27/2020    K 4.3 01/27/2020    CO2 26.7 01/27/2020     01/27/2020    BUN 16 01/27/2020    CREATININE 1.04 01/27/2020    EGFRIFNONA 78 01/27/2020    BCR 15.4 01/27/2020    ANIONGAP 12.3 01/27/2020       Semen Analysis   Latest Reference Range & Units 12/13/22 08:31   Appearance  Cloudy   Semen Color White, Gray  White   Volume, Semen 2.0 - 5.0 mL 1.5 (L)   pH Seminal Fluid >7.0  9.0   Liquefaction Time Normal  Abnormal !   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  His semen analysis is essentially normal.  He only had 1 day of abstinence prior to producing it, instead of the recommended 3-5.   Risk factors:  wifes advanced age    Plan  1.   FU PRN  2.   I recommended they go see reproductive endocrinology at [facility], and placed a referral.  I briefly discussed different assisted reproductive techniques with them, including IUI, IVF, and ICSI.     No follow-ups on file.    Mitul Bautista MD       Pediasure growth and gain 17 april 92 ml/hr, 20 hrs a day.

## 2023-11-21 DIAGNOSIS — M25.562 ARTHRALGIA OF BOTH KNEES: Primary | ICD-10-CM

## 2023-11-21 DIAGNOSIS — M25.561 ARTHRALGIA OF BOTH KNEES: Primary | ICD-10-CM

## 2023-11-22 ENCOUNTER — OFFICE VISIT (OUTPATIENT)
Dept: ORTHOPEDIC SURGERY | Facility: CLINIC | Age: 48
End: 2023-11-22
Payer: COMMERCIAL

## 2023-11-22 VITALS
HEIGHT: 72 IN | WEIGHT: 203.2 LBS | BODY MASS INDEX: 27.52 KG/M2 | DIASTOLIC BLOOD PRESSURE: 98 MMHG | SYSTOLIC BLOOD PRESSURE: 144 MMHG

## 2023-11-22 DIAGNOSIS — M17.10 PATELLOFEMORAL ARTHRITIS: ICD-10-CM

## 2023-11-22 DIAGNOSIS — M25.562 ARTHRALGIA OF BOTH KNEES: Primary | ICD-10-CM

## 2023-11-22 DIAGNOSIS — M22.2X1 PATELLOFEMORAL SYNDROME OF BOTH KNEES: ICD-10-CM

## 2023-11-22 DIAGNOSIS — M22.2X2 PATELLOFEMORAL SYNDROME OF BOTH KNEES: ICD-10-CM

## 2023-11-22 DIAGNOSIS — M25.561 ARTHRALGIA OF BOTH KNEES: Primary | ICD-10-CM

## 2023-11-22 RX ORDER — METHYLPREDNISOLONE ACETATE 40 MG/ML
40 INJECTION, SUSPENSION INTRA-ARTICULAR; INTRALESIONAL; INTRAMUSCULAR; SOFT TISSUE
Status: COMPLETED | OUTPATIENT
Start: 2023-11-22 | End: 2023-11-22

## 2023-11-22 RX ORDER — MELOXICAM 15 MG/1
15 TABLET ORAL DAILY
Qty: 30 TABLET | Refills: 0 | Status: SHIPPED | OUTPATIENT
Start: 2023-11-22

## 2023-11-22 RX ORDER — LIDOCAINE HYDROCHLORIDE 20 MG/ML
2 INJECTION, SOLUTION INFILTRATION; PERINEURAL
Status: COMPLETED | OUTPATIENT
Start: 2023-11-22 | End: 2023-11-22

## 2023-11-22 RX ADMIN — LIDOCAINE HYDROCHLORIDE 2 ML: 20 INJECTION, SOLUTION INFILTRATION; PERINEURAL at 14:28

## 2023-11-22 RX ADMIN — METHYLPREDNISOLONE ACETATE 40 MG: 40 INJECTION, SUSPENSION INTRA-ARTICULAR; INTRALESIONAL; INTRAMUSCULAR; SOFT TISSUE at 14:28

## 2024-03-25 ENCOUNTER — HOSPITAL ENCOUNTER (EMERGENCY)
Facility: HOSPITAL | Age: 49
Discharge: HOME | End: 2024-03-25
Attending: EMERGENCY MEDICINE | Admitting: EMERGENCY MEDICINE
Payer: COMMERCIAL

## 2024-03-25 ENCOUNTER — APPOINTMENT (OUTPATIENT)
Dept: GENERAL RADIOLOGY | Facility: HOSPITAL | Age: 49
End: 2024-03-25
Payer: COMMERCIAL

## 2024-03-25 VITALS
TEMPERATURE: 97.9 F | BODY MASS INDEX: 28.44 KG/M2 | OXYGEN SATURATION: 99 % | HEART RATE: 103 BPM | HEIGHT: 72 IN | SYSTOLIC BLOOD PRESSURE: 156 MMHG | WEIGHT: 210 LBS | RESPIRATION RATE: 18 BRPM | DIASTOLIC BLOOD PRESSURE: 118 MMHG

## 2024-03-25 DIAGNOSIS — M10.9 ACUTE GOUT OF LEFT FOOT, UNSPECIFIED CAUSE: Primary | ICD-10-CM

## 2024-03-25 LAB
APPEARANCE FLD: ABNORMAL
COLOR FLD: YELLOW
CRYSTALS FLD MICRO: ABNORMAL
GLUCOSE FLD-MCNC: 108 MG/DL
MONOS+MACROS NFR FLD: 99 %
NEUTROPHILS NFR FLD MANUAL: 1 %
PROT FLD-MCNC: 3.4 G/DL
RBC # FLD AUTO: 2000 /MM3 (ref 0–200000)
WBC # FLD AUTO: ABNORMAL /MM3 (ref 0–1000)

## 2024-03-25 PROCEDURE — 25010000002 KETOROLAC TROMETHAMINE PER 15 MG: Performed by: EMERGENCY MEDICINE

## 2024-03-25 PROCEDURE — 87205 SMEAR GRAM STAIN: CPT | Performed by: EMERGENCY MEDICINE

## 2024-03-25 PROCEDURE — 89051 BODY FLUID CELL COUNT: CPT | Performed by: EMERGENCY MEDICINE

## 2024-03-25 PROCEDURE — 99283 EMERGENCY DEPT VISIT LOW MDM: CPT

## 2024-03-25 PROCEDURE — 84157 ASSAY OF PROTEIN OTHER: CPT | Performed by: EMERGENCY MEDICINE

## 2024-03-25 PROCEDURE — 73562 X-RAY EXAM OF KNEE 3: CPT

## 2024-03-25 PROCEDURE — 87070 CULTURE OTHR SPECIMN AEROBIC: CPT | Performed by: EMERGENCY MEDICINE

## 2024-03-25 PROCEDURE — 82945 GLUCOSE OTHER FLUID: CPT | Performed by: EMERGENCY MEDICINE

## 2024-03-25 PROCEDURE — 96372 THER/PROPH/DIAG INJ SC/IM: CPT

## 2024-03-25 PROCEDURE — 25010000002 LIDOCAINE 1 % SOLUTION: Performed by: EMERGENCY MEDICINE

## 2024-03-25 PROCEDURE — 89060 EXAM SYNOVIAL FLUID CRYSTALS: CPT | Performed by: EMERGENCY MEDICINE

## 2024-03-25 RX ORDER — IBUPROFEN 600 MG/1
600 TABLET ORAL EVERY 6 HOURS PRN
Qty: 40 TABLET | Refills: 0 | Status: SHIPPED | OUTPATIENT
Start: 2024-03-25

## 2024-03-25 RX ORDER — KETOROLAC TROMETHAMINE 30 MG/ML
30 INJECTION, SOLUTION INTRAMUSCULAR; INTRAVENOUS ONCE
Status: COMPLETED | OUTPATIENT
Start: 2024-03-25 | End: 2024-03-25

## 2024-03-25 RX ORDER — LIDOCAINE HYDROCHLORIDE 10 MG/ML
10 INJECTION, SOLUTION INFILTRATION; PERINEURAL ONCE
Status: COMPLETED | OUTPATIENT
Start: 2024-03-25 | End: 2024-03-25

## 2024-03-25 RX ORDER — PREDNISONE 20 MG/1
20 TABLET ORAL DAILY
Qty: 5 TABLET | Refills: 0 | Status: SHIPPED | OUTPATIENT
Start: 2024-03-25

## 2024-03-25 RX ADMIN — LIDOCAINE HYDROCHLORIDE 10 ML: 10 INJECTION, SOLUTION INFILTRATION; PERINEURAL at 11:02

## 2024-03-25 RX ADMIN — KETOROLAC TROMETHAMINE 30 MG: 30 INJECTION, SOLUTION INTRAMUSCULAR; INTRAVENOUS at 11:03

## 2024-03-25 NOTE — Clinical Note
[facility] EMERGENCY DEPARTMENT  [address] [facility] [address]  Phone: [phone]    Yonathan Ley was seen and treated in our emergency department on 3/25/2024.  He may return to work on 03/27/2024.         Thank you for choosing [facility].    Rich Ballard, DO

## 2024-03-25 NOTE — ED PROVIDER NOTES
[facility] EMERGENCY DEPARTMENT  Emergency Department Encounter  Emergency Medicine Physician Note     Pt Name:Yonathan Ley  MRN: [id number]  Birthdate 1975  Date of evaluation: 3/25/2024  PCP:  Vika Higgins APRN  Note Started: 10:40 AM EDT      CHIEF COMPLAINT       Chief Complaint   Patient presents with    Joint Swelling     Pt states left knee swelling since yesterday, no known injury. Painful.        HISTORY OF PRESENT ILLNESS  (Location/Symptom, Timing/Onset, Context/Setting, Quality, Duration, Modifying Factors, Severity.)      Yonathan Ley is a 48 y.o. male who presents with left knee pain started when patient woke up yesterday morning.  Patient denies any traumatic injury.  Patient denies any history of gout.  Patient does state that it is increasingly swollen, describes pain to touch, worsening pain with bending.  Patient states is difficult to put weight on it.    PAST MEDICAL / SURGICAL / SOCIAL / FAMILY HISTORY     Past Medical History:   Diagnosis Date    Blood in stool      No additional pertinent       Past Surgical History:   Procedure Laterality Date    COLONOSCOPY      COLONOSCOPY N/A 2/21/2020    Procedure: COLONOSCOPY BIOPSY AN DPOLYPECTOMY;  Surgeon: Yoni Judge MD;  Location: [facility] ENDOSCOPY;  Service: Gastroenterology;  Laterality: N/A;    HERNIA REPAIR       No additional pertinent       Social History     Socioeconomic History    Marital status:    Tobacco Use    Smoking status: Never    Smokeless tobacco: Current   Vaping Use    Vaping status: Never Used   Substance and Sexual Activity    Alcohol use: Never    Drug use: Never    Sexual activity: Yes     Partners: Female       Family History   Problem Relation Age of Onset    Ovarian cancer Mother     Diabetes Mother     Lung cancer Father     Diabetes Father        Allergies:  Patient has no known allergies.    Home Medications:  Prior to Admission medications    Medication Sig  "Start Date End Date Taking? Authorizing Provider   meloxicam (Mobic) 15 MG tablet Take 1 tablet by mouth Daily. 11/22/23   Randy Avila PA-C         REVIEW OF SYSTEMS       Review of Systems   Constitutional:  Negative for chills and fever.   Musculoskeletal:  Positive for arthralgias (Left knee) and joint swelling (Left knee).   Skin:  Positive for color change.       PHYSICAL EXAM      INITIAL VITALS:   BP (!) 156/118 (BP Location: Right arm, Patient Position: Sitting)   Pulse 103   Temp 97.9 °F (36.6 °C) (Oral)   Resp 18   Ht 182.9 cm (72\")   Wt 95.3 kg (210 lb)   SpO2 99%   BMI 28.48 kg/m²     Physical Exam  Constitutional:       Appearance: Normal appearance.   HENT:      Head: Normocephalic and atraumatic.      Mouth/Throat:      Mouth: Mucous membranes are moist.   Pulmonary:      Effort: Pulmonary effort is normal.   Musculoskeletal:         General: Swelling (Left knee) and tenderness present.      Comments: Decreased range of motion of the left knee secondary to pain.   Skin:     General: Skin is warm and dry.      Findings: Erythema (Left knee) present.   Neurological:      General: No focal deficit present.      Mental Status: He is alert and oriented to person, place, and time.   Psychiatric:         Mood and Affect: Mood normal.         Behavior: Behavior normal.           DDX/DIAGNOSTIC RESULTS / EMERGENCY DEPARTMENT COURSE / MDM     Differential Diagnosis included but not limited: Gouty arthritis of left knee, septic arthritis    Diagnoses Considered but Do Not Suspect: Left wrist injury fracture, traumatic injury, ligamentous injury    Decision Rules/Scores utilized: N/A    Tests considered but not ordered and why: N/A    MIPS: N/A     Code Status Discussion:  Not Discussed    Additional Patient Education Provided: None     Medical Decision Making    Medical Decision Making  Patient presented with swelling and erythema to the left knee, describes sudden onset.  Low concern for " infectious cause or septic joint as patient had no surgical procedure, denies any open wound.  Plan to obtain aspiration of the left knee, will send for samples.  Pain control with Toradol.    Samples demonstrated concerns for crystals, do feel this is most likely gout arthritis.  Plan to treat patient with steroids and ibuprofen.  Patient follow-up with primary care doctor for allopurinol or other treatment.  No indication for antibiotics at this time.  Will have patient follow-up with orthopedics outpatient.  Patient agreeable to plan discharged home stable condition.  Patient struck to return for any worsening signs of infection, worsening redness, pain is not improving with medication management or any other concerns.    Problems Addressed:  acute gout left knee: complicated acute illness or injury    Amount and/or Complexity of Data Reviewed  Labs: ordered. Decision-making details documented in ED Course.  Radiology: ordered.    Risk  Prescription drug management.        EKG  None Performed     All EKG's are interpreted by the Emergency Department Physician who either signs or Co-signs this chart in the absence of a cardiologist.    Additional Scores                   EMERGENCY DEPARTMENT COURSE:    ED Course as of 03/25/24 1523   Mon Mar 25, 2024   1111 Aspiration and knee performed, 20 cc of synovial fluid obtained.  Sent to lab.  Consent form filled and placed on chart. [CR]   1245 Crystals, Fluid(!): Crystals Seen [CR]      ED Course User Index  [CR] Rich Ballard DO       PROCEDURES:    Arthocentesis    Date/Time: 3/25/2024 3:22 PM    Performed by: Rich Ballard DO  Authorized by: Rich Ballard DO    Consent:     Consent obtained:  Written    Consent given by:  Patient    Risks, benefits, and alternatives were discussed: yes      Risks discussed:  Bleeding, infection, pain and incomplete drainage    Alternatives discussed:  Delayed treatment, alternative treatment and  observation  Universal protocol:     Patient identity confirmed:  Verbally with patient  Location:     Location:  Knee    Knee:  L knee  Anesthesia:     Anesthesia method:  Local infiltration    Local anesthetic:  Lidocaine 1% w/o epi  Procedure details:     Preparation: Patient was prepped and draped in usual sterile fashion      Needle gauge:  18 G    Ultrasound guidance: no      Approach:  Medial    Aspirate characteristics:  Yellow and cloudy    Steroid injected: no      Specimen collected: yes    Post-procedure details:     Dressing:  Adhesive bandage    Procedure completion:  Tolerated well, no immediate complications      DATA FOR LAB AND RADIOLOGY TESTS ORDERED BELOW ARE REVIEWED BY THE ED CLINICIAN:    RADIOLOGY: All x-rays, CT, MRI, and formal ultrasound images (except ED bedside ultrasound) are read by the radiologist, see reports below, unless otherwise noted in MDM or here.  Reports below are reviewed by myself.  XR Knee 3 View Left   Final Result   No acute bony abnormality.       Moderately large left knee effusion, MRI may be helpful.               This report was signed and finalized on 3/25/2024 11:13 AM by Bozena Cool MD.              LABS: Lab orders shown below, the results are reviewed by myself, and all abnormals are listed below.  Labs Reviewed   BODY FLUID CELL COUNT - Abnormal; Notable for the following components:       Result Value    Appearance, Fluid Cloudy (*)     WBC, Fluid 22,630 (*)     All other components within normal limits   SYNOVIAL CRYSTALS SCREEN - Abnormal; Notable for the following components:    Crystals, Fluid Crystals Seen (*)     All other components within normal limits   BODY FLUID CULTURE   BODY FLUID CELL COUNT WITH DIFFERENTIAL    Narrative:     The following orders were created for panel order Body Fluid Cell Count With Differential - Synovial Fluid, Knee, Left.  Procedure                               Abnormality         Status                     ---------    "                            -----------         ------                     Body fluid cell count - ...[[id number]]  Abnormal            Final result               Body fluid differential ...[[id number]]                      Final result                 Please view results for these tests on the individual orders.   BODY FLUID DIFFERENTIAL   GLUCOSE, BODY FLUID   PROTEIN, BODY FLUID   CRYSTAL EXAM       Vitals Reviewed:    Vitals:    03/25/24 1010 03/25/24 1030 03/25/24 1136 03/25/24 1318   BP: (!) 156/118      BP Location: Right arm      Patient Position: Sitting      Pulse: 103      Resp: 18      Temp:  97.9 °F (36.6 °C)     TempSrc: Oral Oral     SpO2: 100%  97% 99%   Weight: 95.3 kg (210 lb)      Height: 182.9 cm (72\")          MEDICATIONS GIVEN TO PATIENT THIS ENCOUNTER:  Medications   ketorolac (TORADOL) injection 30 mg (30 mg Intramuscular Given 3/25/24 1103)   lidocaine (XYLOCAINE) 1 % injection 10 mL (10 mL Injection Given by Other 3/25/24 1102)       CONSULTS:  None    CRITICAL CARE:  There was significant risk of life threatening deterioration of patient's condition requiring my direct management. Critical care time 0 minutes, excluding any documented procedures.    FINAL IMPRESSION      1. acute gout left knee          DISPOSITION / PLAN     ED Disposition       ED Disposition   Discharge    Condition   --    Comment   --               PATIENT REFERRED TO:  Vika Higgins, IRAM  [address] [facility] [address] [phone]    In 1 week      Jona Lemus MD  [address] [place] [address] [phone]            DISCHARGE MEDICATIONS:     Medication List        START taking these medications      ibuprofen 600 MG tablet  Commonly known as: ADVIL,MOTRIN  Take 1 tablet by mouth Every 6 (Six) Hours As Needed for Mild Pain.     predniSONE 20 MG tablet  Commonly known as: DELTASONE  Take 1 tablet by mouth Daily.            CONTINUE taking these medications      meloxicam 15 MG " tablet  Commonly known as: Mobic  Take 1 tablet by mouth Daily.               Where to Get Your Medications        These medications were sent to [id number] - [place], [place] - [address] [facility] [address] [place] [address] - [phone] PH - [phone] FX  [address] [place] [address] [place] [address]      Phone: [phone]   ibuprofen 600 MG tablet  predniSONE 20 MG tablet         Electronically signed by Rich Ballard DO, 03/25/24, 10:40 AM EDT.    Emergency Medicine Physician  [facility]  (Please note that portions of thisnote were completed with a voice recognition program.  Efforts were made to edit the dictations but occasionally words are mis-transcribed.)      Rich Ballard DO  03/25/24 1524

## 2024-03-26 LAB — CRYSTALS FLD MICRO: NORMAL

## 2024-03-30 LAB
BACTERIA FLD CULT: NORMAL
GRAM STN SPEC: NORMAL
GRAM STN SPEC: NORMAL

## 2025-03-12 ENCOUNTER — OFFICE VISIT (OUTPATIENT)
Dept: UROLOGY | Facility: CLINIC | Age: 50
End: 2025-03-12
Payer: COMMERCIAL

## 2025-03-12 VITALS
DIASTOLIC BLOOD PRESSURE: 104 MMHG | SYSTOLIC BLOOD PRESSURE: 140 MMHG | WEIGHT: 198.8 LBS | HEART RATE: 88 BPM | BODY MASS INDEX: 26.96 KG/M2 | TEMPERATURE: 98 F | OXYGEN SATURATION: 99 %

## 2025-03-12 DIAGNOSIS — Z20.2 STD EXPOSURE: Primary | ICD-10-CM

## 2025-03-12 NOTE — PROGRESS NOTES
Office Visit     Patient Name: Yonathan Ley  : 1975   MRN: [id number]   Patient or patient representative verbalized consent for the use of Ambient Listening during the visit with  IRAM Mathew for chart documentation. 3/12/2025  16:17 EDT    Chief Complaint   Patient presents with    Exposure to STD     Referring Provider: No ref. provider found    Primary Care Provider: Vika Higgins APRN     HPI:  Mr. Ley is a 49 year old male who presents for examination due to history of exposure to HPV.   He reports that he recently found out that his exwife has genital warts a few days ago. He has not had sexual relations with his exwife in 4 years.  He has been in a mutually monogamous relationship with his significant other for the past 4 years. He denies any masses or lumps on his genitalia, no UTI symptoms, no penile discharge.    Subjective   Review of System: As noted in HPI.    Past Medical History:   Diagnosis Date    Blood in stool      Past Surgical History:   Procedure Laterality Date    COLONOSCOPY      COLONOSCOPY N/A 2020    Procedure: COLONOSCOPY BIOPSY AN DPOLYPECTOMY;  Surgeon: Yoni Judge MD;  Location: [facility] ENDOSCOPY;  Service: Gastroenterology;  Laterality: N/A;    HERNIA REPAIR       Family History   Problem Relation Age of Onset    Lung cancer Father     Diabetes Father     Ovarian cancer Mother     Diabetes Mother     Prostate cancer Neg Hx     Kidney cancer Neg Hx     Testicular cancer Neg Hx      Social History     Socioeconomic History    Marital status: Significant Other   Tobacco Use    Smoking status: Never     Passive exposure: Current    Smokeless tobacco: Current     Types: Snuff    Tobacco comments:     Half a can a day for 42 years   Vaping Use    Vaping status: Never Used   Substance and Sexual Activity    Alcohol use: Never    Drug use: Never    Sexual activity: Yes     Partners: Female     No current outpatient medications on  file.    No Known Allergies  Objective   Visit Vitals  BP (!) 140/104 (BP Location: Left arm, Patient Position: Sitting, Cuff Size: Adult)   Pulse 88   Temp 98 °F (36.7 °C) (Temporal)   Wt 90.2 kg (198 lb 12.8 oz)   SpO2 99%   BMI 26.96 kg/m²      Body mass index is 26.96 kg/m².   Physical Exam  Vitals and nursing note reviewed. Exam conducted with a chaperone present.   Constitutional:       General: He is not in acute distress.     Appearance: Normal appearance. He is not ill-appearing.   HENT:      Head: Normocephalic and atraumatic.   Pulmonary:      Effort: Pulmonary effort is normal.   Abdominal:      Hernia: There is no hernia in the left inguinal area or right inguinal area.   Genitourinary:     Pubic Area: No rash.       Penis: Normal and uncircumcised.       Testes: Normal.      Epididymis:      Right: Not inflamed.      Left: Not inflamed.      Comments: No evidence of genital warts.   Skin:     General: Skin is warm and dry.   Neurological:      General: No focal deficit present.      Mental Status: He is alert and oriented to person, place, and time.   Psychiatric:         Mood and Affect: Mood normal.         Behavior: Behavior normal.      Labs    Lab Results   Component Value Date    WBC 8.38 01/27/2020    HGB 16.5 01/27/2020    HCT 46.7 01/27/2020    MCV 86.2 01/27/2020     01/27/2020     Lab Results   Component Value Date    GLUCOSE 90 01/27/2020    CALCIUM 9.6 01/27/2020     01/27/2020    K 4.3 01/27/2020    CO2 26.7 01/27/2020     01/27/2020    BUN 16 01/27/2020    CREATININE 1.04 01/27/2020    BCR 15.4 01/27/2020    ANIONGAP 12.3 01/27/2020    ALT 14 01/27/2020    AST 19 01/27/2020     Radiographic Studies  No Images in the past 120 days found.    I have reviewed the above labs and imaging.     Assessment / Plan    Diagnoses and all orders for this visit:    1. STD exposure (Primary)    1) STD exposure  - No evidence of genital warts on examination today.    - Benign  examination.    - Encouraged him to make sure to get plenty of exercise, eat a protein rich diet.  Drink 64 ounces of water and start a multivitamin to remain as healthy as possible.    - FU as needed.     Return for f/u with Margarita TOWNSEND .    Margarita Robb, MSN, APRN, FNP-C  [facility] [place]

## 2025-05-21 ENCOUNTER — OFFICE VISIT (OUTPATIENT)
Age: 50
End: 2025-05-21
Payer: COMMERCIAL

## 2025-05-21 VITALS
OXYGEN SATURATION: 97 % | BODY MASS INDEX: 27.9 KG/M2 | WEIGHT: 206 LBS | TEMPERATURE: 97.3 F | RESPIRATION RATE: 14 BRPM | HEIGHT: 72 IN | SYSTOLIC BLOOD PRESSURE: 154 MMHG | DIASTOLIC BLOOD PRESSURE: 83 MMHG | HEART RATE: 81 BPM

## 2025-05-21 DIAGNOSIS — Z13.220 SCREENING CHOLESTEROL LEVEL: ICD-10-CM

## 2025-05-21 DIAGNOSIS — E55.9 VITAMIN D DEFICIENCY: ICD-10-CM

## 2025-05-21 DIAGNOSIS — Z87.39 HISTORY OF GOUT: ICD-10-CM

## 2025-05-21 DIAGNOSIS — R73.9 ELEVATED BLOOD SUGAR: Primary | ICD-10-CM

## 2025-05-21 DIAGNOSIS — Z13.29 THYROID DISORDER SCREEN: ICD-10-CM

## 2025-05-21 PROCEDURE — 99203 OFFICE O/P NEW LOW 30 MIN: CPT | Performed by: NURSE PRACTITIONER

## 2025-05-21 SDOH — ECONOMIC STABILITY: FOOD INSECURITY: WITHIN THE PAST 12 MONTHS, YOU WORRIED THAT YOUR FOOD WOULD RUN OUT BEFORE YOU GOT MONEY TO BUY MORE.: NEVER TRUE

## 2025-05-21 SDOH — ECONOMIC STABILITY: FOOD INSECURITY: WITHIN THE PAST 12 MONTHS, THE FOOD YOU BOUGHT JUST DIDN'T LAST AND YOU DIDN'T HAVE MONEY TO GET MORE.: NEVER TRUE

## 2025-05-21 ASSESSMENT — PATIENT HEALTH QUESTIONNAIRE - PHQ9
1. LITTLE INTEREST OR PLEASURE IN DOING THINGS: NOT AT ALL
SUM OF ALL RESPONSES TO PHQ QUESTIONS 1-9: 0
SUM OF ALL RESPONSES TO PHQ QUESTIONS 1-9: 0
2. FEELING DOWN, DEPRESSED OR HOPELESS: NOT AT ALL
SUM OF ALL RESPONSES TO PHQ QUESTIONS 1-9: 0
SUM OF ALL RESPONSES TO PHQ QUESTIONS 1-9: 0

## 2025-05-21 NOTE — PROGRESS NOTES
Chief Complaint   Patient presents with    [facility]       Have you seen any other physician or provider since your last visit no    Have you had any other diagnostic tests since your last visit? no    Have you changed or stopped any medications since your last visit? no        SUBJECTIVE:    Patient ID:Lawrence Simmons is a 49 y.o. male.    Chief Complaint   Patient presents with    [facility]     HPI:  Patient is here to establish care with a doctor.   History of Present Illness  The patient presents for establishment of care.    He has no significant medical history, with the exception of gout, which he experiences intermittently. He does not take any medication for gout. He reports that his gout flare-ups occur without any specific dietary triggers and typically resolve by the next morning.    He has undergone a colonoscopy and hernia repair in the past. He reports no exposure to environmental toxins or fumes. He does not smoke, drink alcohol, use drugs, or vape. He was exposed to secondhand smoke as a child.    He attributes his elevated blood pressure readings to white coat syndrome, as his home readings are typically within the normal range. There is a family history of diabetes and cancer, including lung cancer in his father and cancer in his mother and grandfather.    PAST SURGICAL HISTORY:  He has undergone a colonoscopy and hernia repair.    SOCIAL HISTORY  He does not smoke, drink alcohol, use drugs, or vape.    FAMILY HISTORY  His mother has a history of cancer. His father had lung cancer in his 60s. His grandfather  of cancer. There is a family history of diabetes and hypertension.      Patient's medications, allergies, past medical, surgical, social and family histories were reviewed and updated as appropriate.          Review of Systems   Constitutional: Negative.    HENT: Negative.     Eyes: Negative.    Respiratory: Negative.     Cardiovascular: Negative.    Gastrointestinal:

## 2025-05-24 ENCOUNTER — HOSPITAL ENCOUNTER (OUTPATIENT)
Facility: HOSPITAL | Age: 50
Discharge: HOME | End: 2025-05-24

## 2025-05-24 DIAGNOSIS — E55.9 VITAMIN D DEFICIENCY: ICD-10-CM

## 2025-05-24 DIAGNOSIS — Z13.220 SCREENING CHOLESTEROL LEVEL: ICD-10-CM

## 2025-05-24 DIAGNOSIS — Z87.39 HISTORY OF GOUT: ICD-10-CM

## 2025-05-24 DIAGNOSIS — R73.9 ELEVATED BLOOD SUGAR: ICD-10-CM

## 2025-05-24 DIAGNOSIS — Z13.29 THYROID DISORDER SCREEN: ICD-10-CM

## 2025-05-24 LAB
25(OH)D3 SERPL-MCNC: 10.7 NG/ML (ref 32–100)
ALBUMIN SERPL-MCNC: 4.1 G/DL (ref 3.4–4.8)
ALBUMIN/GLOB SERPL: 1.2 {RATIO} (ref 0.8–2)
ALP SERPL-CCNC: 65 U/L (ref 25–100)
ALT SERPL-CCNC: 15 U/L (ref 4–36)
ANION GAP SERPL CALCULATED.3IONS-SCNC: 9 MMOL/L (ref 3–16)
AST SERPL-CCNC: 21 U/L (ref 8–33)
BILIRUB SERPL-MCNC: 0.5 MG/DL (ref 0.3–1.2)
BUN SERPL-MCNC: 18 MG/DL (ref 6–20)
CALCIUM SERPL-MCNC: 9.5 MG/DL (ref 8.3–10.6)
CHLORIDE SERPL-SCNC: 103 MMOL/L (ref 98–107)
CHOLEST SERPL-MCNC: 212 MG/DL (ref 0–200)
CO2 SERPL-SCNC: 23 MMOL/L (ref 20–30)
CREAT SERPL-MCNC: 1 MG/DL (ref 0.9–1.3)
ERYTHROCYTE [DISTWIDTH] IN BLOOD BY AUTOMATED COUNT: 13.9 % (ref 11–16)
FOLATE SERPL-MCNC: 15.5 NG/ML
GFR SERPLBLD CREATININE-BSD FMLA CKD-EPI: >90 ML/MIN/{1.73_M2}
GLOBULIN SER CALC-MCNC: 3.5 G/DL
GLUCOSE SERPL-MCNC: 114 MG/DL (ref 74–106)
HCT VFR BLD AUTO: 50.8 % (ref 40–54)
HDLC SERPL-MCNC: 30 MG/DL (ref 40–60)
HGB BLD-MCNC: 16.9 G/DL (ref 13–18)
LDLC SERPL CALC-MCNC: 145 MG/DL
MCH RBC QN AUTO: 28.9 PG (ref 27–32)
MCHC RBC AUTO-ENTMCNC: 33.3 G/DL (ref 31–35)
MCV RBC AUTO: 87 FL (ref 80–100)
PLATELET # BLD AUTO: 201 K/UL (ref 150–400)
PMV BLD AUTO: 9.8 FL (ref 6–10)
POTASSIUM SERPL-SCNC: 4.1 MMOL/L (ref 3.4–5.1)
PROT SERPL-MCNC: 7.6 G/DL (ref 6.4–8.3)
RBC # BLD AUTO: 5.84 M/UL (ref 4.5–6)
SODIUM SERPL-SCNC: 135 MMOL/L (ref 136–145)
TRIGL SERPL-MCNC: 187 MG/DL (ref 0–249)
TSH SERPL DL<=0.005 MIU/L-ACNC: 7.07 UIU/ML (ref 0.27–4.2)
URATE SERPL-MCNC: 9.5 MG/DL (ref 3.7–8.6)
VIT B12 SERPL-MCNC: 413 PG/ML (ref 211–911)
VLDLC SERPL CALC-MCNC: 37 MG/DL
WBC # BLD AUTO: 5.5 K/UL (ref 4–11)

## 2025-06-01 ENCOUNTER — RESULTS FOLLOW-UP (OUTPATIENT)
Age: 50
End: 2025-06-01

## 2025-06-01 RX ORDER — ERGOCALCIFEROL 1.25 MG/1
50000 CAPSULE, LIQUID FILLED ORAL WEEKLY
Qty: 12 CAPSULE | Refills: 1 | Status: SHIPPED | OUTPATIENT
Start: 2025-06-01

## 2025-06-01 RX ORDER — ALLOPURINOL 100 MG/1
100 TABLET ORAL DAILY
Qty: 30 TABLET | Refills: 3 | Status: SHIPPED | OUTPATIENT
Start: 2025-06-01

## 2025-06-01 RX ORDER — LEVOTHYROXINE SODIUM 50 UG/1
50 TABLET ORAL DAILY
Qty: 30 TABLET | Refills: 5 | Status: SHIPPED | OUTPATIENT
Start: 2025-06-01

## (undated) DEVICE — HYBRID TUBING/CAP SET FOR OLYMPUS® SCOPES: Brand: ERBE

## (undated) DEVICE — ENDOSCOPY PORT CONNECTOR FOR OLYMPUS® SCOPES: Brand: ERBE

## (undated) DEVICE — FRCP BIOP COLD ENDOJAW ALLGTR W/NDL 2.8X2300MM BLU

## (undated) DEVICE — LUBE JELLY PK/2.75GM STRL BX/144

## (undated) DEVICE — Device: Brand: DEFENDO AIR/WATER/SUCTION AND BIOPSY VALVE

## (undated) DEVICE — PAD GRND REM POLYHESIVE A/ DISP

## (undated) DEVICE — Device